# Patient Record
Sex: FEMALE | Race: WHITE | URBAN - METROPOLITAN AREA
[De-identification: names, ages, dates, MRNs, and addresses within clinical notes are randomized per-mention and may not be internally consistent; named-entity substitution may affect disease eponyms.]

---

## 2018-05-20 ENCOUNTER — OFFICE VISIT (OUTPATIENT)
Dept: URGENT CARE | Facility: URGENT CARE | Age: 38
End: 2018-05-20
Payer: OTHER GOVERNMENT

## 2018-05-20 VITALS
HEART RATE: 74 BPM | TEMPERATURE: 98.4 F | OXYGEN SATURATION: 100 % | HEIGHT: 68 IN | DIASTOLIC BLOOD PRESSURE: 91 MMHG | WEIGHT: 150 LBS | BODY MASS INDEX: 22.73 KG/M2 | SYSTOLIC BLOOD PRESSURE: 138 MMHG

## 2018-05-20 DIAGNOSIS — B97.89 VIRAL LARYNGITIS: Primary | ICD-10-CM

## 2018-05-20 DIAGNOSIS — J04.0 VIRAL LARYNGITIS: Primary | ICD-10-CM

## 2018-05-20 DIAGNOSIS — R05.9 COUGH: ICD-10-CM

## 2018-05-20 PROCEDURE — 99203 OFFICE O/P NEW LOW 30 MIN: CPT | Performed by: PHYSICIAN ASSISTANT

## 2018-05-20 RX ORDER — CODEINE PHOSPHATE AND GUAIFENESIN 10; 100 MG/5ML; MG/5ML
1 SOLUTION ORAL EVERY 4 HOURS PRN
Qty: 25 ML | Refills: 0 | Status: SHIPPED | OUTPATIENT
Start: 2018-05-20

## 2018-05-20 RX ORDER — PREDNISONE 20 MG/1
40 TABLET ORAL DAILY
Qty: 10 TABLET | Refills: 0 | Status: SHIPPED | OUTPATIENT
Start: 2018-05-20 | End: 2018-05-25

## 2018-05-20 NOTE — PATIENT INSTRUCTIONS
1. Take prednisone 40mg daily x 5 days. Take with food.  2. Take Robitussin with codeine as needed at bedtime for cough. Do not take during the day, while driving/operating machinery, drinking alcohol, or doing other tasks requiring concentration as this can make you drowsy.  3. Follow up with your doctor if no improvement in 1 week, sooner if worsening.

## 2018-05-20 NOTE — MR AVS SNAPSHOT
"              After Visit Summary   5/20/2018    Tiesha Kat    MRN: [id number]           Patient Information     Date Of Birth          1980        Visit Information        Provider Department      5/20/2018 10:15 AM Veronica Russell PA-C [facility]        Today's Diagnoses     Viral laryngitis    -  1      Care Instructions    1. Take prednisone 40mg daily x 5 days. Take with food.  2. Take Robitussin with codeine as needed at bedtime for cough. Do not take during the day, while driving/operating machinery, drinking alcohol, or doing other tasks requiring concentration as this can make you drowsy.  3. Follow up with your doctor if no improvement in 1 week, sooner if worsening.          Follow-ups after your visit        Follow-up notes from your care team     Return if symptoms worsen or fail to improve.      Who to contact     If you have questions or need follow up information about today's clinic visit or your schedule please contact [facility] directly at [phone].  Normal or non-critical lab and imaging results will be communicated to you by [employer], letter or phone within 4 business days after the clinic has received the results. If you do not hear from us within 7 days, please contact the clinic through [employer] or phone. If you have a critical or abnormal lab result, we will notify you by phone as soon as possible.  Submit refill requests through [employer] or call your pharmacy and they will forward the refill request to us. Please allow 3 business days for your refill to be completed.          Additional Information About Your Visit        [employer] Information     [employer] lets you send messages to your doctor, view your test results, renew your prescriptions, schedule appointments and more. To sign up, go to [email or web address] . Click on \"Log in\" on the left side of the screen, which will take you to the Welcome page. Then click on \"Sign up Now\" on the " "right side of the page.     You will be asked to enter the access code listed below, as well as some personal information. Please follow the directions to create your username and password.     Your access code is: [email or web address]  Expires: 2018 11:28 AM     Your access code will  in 90 days. If you need help or a new code, please call your [facility] or [phone].        Care EveryWhere ID     This is your Care EveryWhere ID. This could be used by other organizations to access your [facility] medical records  [id number]        Your Vitals Were     Pulse Temperature Height Pulse Oximetry BMI (Body Mass Index)       74 98.4  F (36.9  C) (Oral) 5' 8\" (1.727 m) 100% 22.81 kg/m2        Blood Pressure from Last 3 Encounters:   18 (!) 138/91    Weight from Last 3 Encounters:   18 150 lb (68 kg)              Today, you had the following     No orders found for display         Today's Medication Changes          These changes are accurate as of 18 11:28 AM.  If you have any questions, ask your nurse or doctor.               Start taking these medicines.        Dose/Directions    guaiFENesin-codeine 100-10 MG/5ML Soln solution   Commonly known as:  ROBITUSSIN AC   Used for:  Viral laryngitis   Started by:  Veronica Russell PA-C        Dose:  1 tsp.   Take 5 mLs by mouth every 4 hours as needed for cough   Quantity:  25 mL   Refills:  0       predniSONE 20 MG tablet   Commonly known as:  DELTASONE   Used for:  Viral laryngitis   Started by:  Veronica Russell PA-C        Dose:  40 mg   Take 2 tablets (40 mg) by mouth daily for 5 days   Quantity:  10 tablet   Refills:  0            Where to get your medicines      These medications were sent to [facility] [address] [place], [place] -  [facility]   [facility] [address]     Phone:  [phone]     predniSONE 20 MG tablet         Some of these will need a paper prescription and others can be bought over " the counter.  Ask your nurse if you have questions.     Bring a paper prescription for each of these medications     guaiFENesin-codeine 100-10 MG/5ML Soln solution                Primary Care Provider Office Phone # Fax #    [facility] [phone] [address] [address] [facility] [address]        Equal Access to Services     ESTEBAN WOOD : Hadii aad ku hadasho Soomaali, waaxda luqadaha, qaybta kaalmada adeegyada, waxay idiin hayaan adeeg khfrankyjen lacatrachita . So [facility] [phone].    ATENCIÓN: Si habla español, tiene a patterson disposición servicios gratuitos de asistencia lingüística. Llame al [phone].    We comply with applicable federal civil rights laws and [place] laws. We do not discriminate on the basis of race, color, national origin, age, disability, sex, sexual orientation, or gender identity.            Thank you!     Thank you for choosing [facility]  for your care. Our goal is always to provide you with excellent care. Hearing back from our patients is one way we can continue to improve our services. Please take a few minutes to complete the written survey that you may receive in the mail after your visit with us. Thank you!             Your Updated Medication List - Protect others around you: Learn how to safely use, store and throw away your medicines at www.disposemymeds.org.          This list is accurate as of 5/20/18 11:28 AM.  Always use your most recent med list.                   Brand Name Dispense Instructions for use Diagnosis    guaiFENesin-codeine 100-10 MG/5ML Soln solution    ROBITUSSIN AC    25 mL    Take 5 mLs by mouth every 4 hours as needed for cough    Viral laryngitis       predniSONE 20 MG tablet    DELTASONE    10 tablet    Take 2 tablets (40 mg) by mouth daily for 5 days    Viral laryngitis

## 2018-05-20 NOTE — PROGRESS NOTES
"SUBJECTIVE:   Tiesha Kat is a 38 year old female presenting with a chief complaint of   Chief Complaint   Patient presents with     URI     Started Mothers day. Came from [place] where she lives.  C/o congestion, sore throat, cough productive in day and tight at night. Taken a lot of dayquil, theraflu. Cough is worse at night.       Onset of symptoms was 1 week(s) ago.  Course of illness is worsening.    Severity moderately severe  Current and Associated symptoms: runny nose, stuffy nose, cough - non-productive, sore throat, and hoarse voice. Cough is \"constant\" at night.  No fever. No nausea, vomiting. No shortness of breath, wheezing. She admits to fatigue and body aches.  Treatment measures tried include Decongestants and Theraflu.  Predisposing factors include: recent travel  She does have seasonal allergies.     ROS  See HPI    PMH:  History reviewed. No pertinent past medical history.    Current Medications:  Current Outpatient Prescriptions   Medication Sig Dispense Refill     guaiFENesin-codeine (ROBITUSSIN AC) 100-10 MG/5ML SOLN solution Take 5 mLs by mouth every 4 hours as needed for cough 25 mL 0       Surgical History:  History reviewed. No pertinent surgical history.    Family History:  Family History   Problem Relation Age of Onset     DIABETES Maternal Grandmother      Hypertension Maternal Grandmother      Other Cancer Maternal Grandmother      Other Cancer Maternal Grandfather      Other Cancer Paternal Grandmother      Other Cancer Paternal Grandfather        Social History:  Social History   Substance Use Topics     Smoking status: Never Smoker     Smokeless tobacco: Never Used     Alcohol use Yes      Comment: occasionally         OBJECTIVE  BP (!) 138/91 (BP Location: Right arm, Patient Position: Sitting, Cuff Size: Adult Regular)  Pulse 74  Temp 98.4  F (36.9  C) (Oral)  Ht 5' 8\" (1.727 m)  Wt 150 lb (68 kg)  SpO2 100%  BMI 22.81 kg/m2    General: alert, appears well, NAD. " Afebrile.  Skin: no suspicious lesions or rashes.  HEENT: Normocephalic.   Eyes: conjunctiva clear.   Ears: TMs pearly, translucent bilaterally.   Nose: + erythema of nasal mucosa. Clear rhinorrhea.  Oropharynx: speaks with very hoarse voice. MMM. ++ posterior pharyngeal erythema. No exudate. No tonsillar hypertrophy. Uvula midline.    Neck: supple, no lymphadenopathy.  Respiratory: No distress. Equal inspiration to bilateral bases. No crackles wheeze, rhonchi, rales.   Cardiovascular: RRR. No murmurs, clicks, gallups, or rub.             ASSESSMENT/PLAN:    ICD-10-CM    1. Viral laryngitis J04.0 predniSONE (DELTASONE) 20 MG tablet    B97.89 guaiFENesin-codeine (ROBITUSSIN AC) 100-10 MG/5ML SOLN solution   2. Cough R05 predniSONE (DELTASONE) 20 MG tablet     guaiFENesin-codeine (ROBITUSSIN AC) 100-10 MG/5ML SOLN solution           Medical Decision Making:    Differential Diagnosis:  - Strep - unlikely, 0 Centor criteria, would not test  - CAP - lungs were completely clear and no fever. Do not think CXR is indicated.  - viral laryngitis- highly likely with her symptom set.   - bronchitis - no wheezing on lung exam  - bacterial sinusitis- do not suspect this with just 1 week of symptoms and no overwhelming complaint of sinus pain    Serious Comorbid Conditions: none    Prescribed prednisone for laryngitis as well as recommending supportive cares.  Prescribed Robitussin AC prn cough- discussed safe use at bedtime, discussed adverse effect of sedation.  Follow up with PCP if no improvement or worsening.    At the end of the encounter, I discussed all available results, as well as the diagnosis and any associated medications. I discussed red flags for immediate return to clinic/ER, as well as indications for follow up. Refer to patient instructions below, which were all addressed with patient. Patient understood and agreed to plan. Patient was appropriate for discharge.      Patient Instructions   1. Take prednisone 40mg  daily x 5 days. Take with food.  2. Take Robitussin with codeine as needed at bedtime for cough. Do not take during the day, while driving/operating machinery, drinking alcohol, or doing other tasks requiring concentration as this can make you drowsy.  3. Follow up with your doctor if no improvement in 1 week, sooner if worsening.              Veronica Russell PA-C